# Patient Record
Sex: FEMALE | URBAN - METROPOLITAN AREA
[De-identification: names, ages, dates, MRNs, and addresses within clinical notes are randomized per-mention and may not be internally consistent; named-entity substitution may affect disease eponyms.]

---

## 2019-02-25 ENCOUNTER — TRANSFERRED RECORDS (OUTPATIENT)
Dept: HEALTH INFORMATION MANAGEMENT | Facility: CLINIC | Age: 49
End: 2019-02-25

## 2019-02-26 ENCOUNTER — TRANSFERRED RECORDS (OUTPATIENT)
Dept: HEALTH INFORMATION MANAGEMENT | Facility: CLINIC | Age: 49
End: 2019-02-26

## 2020-03-02 ENCOUNTER — TRANSFERRED RECORDS (OUTPATIENT)
Dept: HEALTH INFORMATION MANAGEMENT | Facility: CLINIC | Age: 50
End: 2020-03-02

## 2023-01-17 ENCOUNTER — DOCUMENTATION ONLY (OUTPATIENT)
Dept: TRANSPLANT | Facility: CLINIC | Age: 53
End: 2023-01-17

## 2023-01-18 NOTE — PROGRESS NOTES
Collaboratively reviewed by clinical supervisor, , and data abstraction team at [facility] on 1/4/2023. Determined to be LOST TO FOLLOW UP. Episode updated to accurately reflect transplant episode.

## 2024-06-12 ENCOUNTER — TELEPHONE (OUTPATIENT)
Dept: TRANSPLANT | Facility: CLINIC | Age: 54
End: 2024-06-12

## 2024-06-12 NOTE — TELEPHONE ENCOUNTER
PCS spoke with Bobby with [facility], who received a TIEDI form for Iza.  Reviewing records available in [facility], Iza follows with [facility], not [facility].     PCS asked Bobby to decline TIEDI request and [facility] will request [facility]D to transfer to [place].

## 2024-06-12 NOTE — TELEPHONE ENCOUNTER
Patient Call: General  Route to LPN    Reason for call: Bobby Leyva from [place] in [facility]   called in regards of patient and having receievd a referral request.  More details with call back.    Call back needed? Yes    Return Call Needed  Same as documented in contacts section  When to return call?: Same day: Route High Priority